# Patient Record
Sex: FEMALE | Race: BLACK OR AFRICAN AMERICAN | NOT HISPANIC OR LATINO | Employment: FULL TIME | URBAN - METROPOLITAN AREA
[De-identification: names, ages, dates, MRNs, and addresses within clinical notes are randomized per-mention and may not be internally consistent; named-entity substitution may affect disease eponyms.]

---
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
## 2021-03-10 ENCOUNTER — HISTORICAL (OUTPATIENT)
Dept: ADMINISTRATIVE | Facility: HOSPITAL | Age: 21
End: 2021-03-10

## 2021-03-10 LAB
CHOLEST SERPL-MCNC: 184 MG/DL
CHOLEST/HDLC SERPL: 6 {RATIO} (ref 0–5)
EST. AVERAGE GLUCOSE BLD GHB EST-MCNC: 116.9 MG/DL
HAV IGM SERPL QL IA: NONREACTIVE
HBA1C MFR BLD: 5.7 %
HBV CORE IGM SERPL QL IA: NONREACTIVE
HBV SURFACE AG SERPL QL IA: NONREACTIVE
HCV AB SERPL QL IA: NONREACTIVE
HDLC SERPL-MCNC: 32 MG/DL (ref 35–60)
HIV 1+2 AB+HIV1 P24 AG SERPL QL IA: NONREACTIVE
LDLC SERPL CALC-MCNC: 130 MG/DL (ref 50–140)
POC BETA-HCG (QUAL): NEGATIVE
PROLACTIN LEVEL (OHS): 10.59 NG/ML (ref 5.18–26.53)
T PALLIDUM AB SER QL: NONREACTIVE
TRIGL SERPL-MCNC: 109 MG/DL (ref 37–140)
TSH SERPL-ACNC: 0.02 UIU/ML (ref 0.35–4.94)
VLDLC SERPL CALC-MCNC: 22 MG/DL

## 2022-04-10 ENCOUNTER — HISTORICAL (OUTPATIENT)
Dept: ADMINISTRATIVE | Facility: HOSPITAL | Age: 22
End: 2022-04-10
Payer: MEDICAID

## 2022-04-25 VITALS
SYSTOLIC BLOOD PRESSURE: 117 MMHG | HEIGHT: 62 IN | WEIGHT: 251.75 LBS | OXYGEN SATURATION: 100 % | BODY MASS INDEX: 46.33 KG/M2 | DIASTOLIC BLOOD PRESSURE: 79 MMHG

## 2022-05-03 NOTE — HISTORICAL [facility] CERNER
This is a historical note converted from [employer]. Formatting and pictures may have been removed.  Please reference [employer] for original formatting and attached multimedia. Chief Complaint  Annual  History of Present Illness  Patient has PMH obesity. Follows with outside PCP.  ?   Requesting STD testing; Does not want to do breast or pelvic exam.  Reports cycles are irregular.  Previously on OCPs but stopped because she was?forgetting to take.  Sexually active, occasionally uses protection.  Denied breast complaints.  Denied abnormal vaginal? pain, discharge.?  Denied family history of breast, ovarian, uterine, colon, cancer?  Review of Systems  ?  Denied dyspareunia, Denied dysmenorrhea  Denied breast changes?  Denied changes in stool  Denied constipation  Denied insomnia  Denied depression, Denied anxiety  Denied skin changes  Denied weight changes  Denied chest pain,Denied SOB  Physical Exam  Vitals & Measurements  T:?36.9? ?C (Oral)? HR:?91(Peripheral)? RR:?18? BP:?117/79? SpO2:?100%?  HT:?158.00?cm? WT:?114.200?kg? BMI:?45.75?  General: Alert, well appearing, in no acute distress, pleasant  Eye: EOMI, clear conjunctiva, No pallor,?  HENT: wearing mask  Neck: full range of motion, no thyromegaly or lymphadenopathy appreciated  Breast: Declined  Respiratory: breathing unlabored, no audible wheezing  Cardiovascular: regular rate and rhythm?  Gastrointestinal: soft, non-tender, non-distended?  Genitourinary: Declined  Musculoskeletal: Able to ambulate to exam table without difficulty  No open wounds, no ulcerations, sensations grossly intact  Integumentary: no rashes or skin lesions visible  Neurologic: no signs of peripheral neurological deficit, motor/sensory function intact ?  Assessment/Plan  Concern about STD in female without diagnosis?Z71.1  Ordered:  Chlam trachom & N gonorrhoeae by [facility] [id number], Routine collect, Cervical, Order for future visit, 03/10/21 14:09:00 CST, Stop date 03/10/21 14:09:00 CST,  Nurse collect, Obesity  Contraception management  Concern about STD in female without diagnosis, 03/10/21 14:09:00 CST  Hepatitis Panel-, Routine collect, 03/10/21 14:10:00 CST, Blood, Order for future visit, Stop date 03/10/21 14:10:00 CST, Lab Collect, Obesity  Contraception management  Concern about STD in female without diagnosis, 03/10/21 14:10:00 CST  HIV 1 and 2, Now collect, 03/10/21 14:09:00 CST, Blood, Order for future visit, Stop date 03/10/21 14:09:00 CST, Lab Collect, Obesity  Contraception management  Concern about STD in female without diagnosis, 03/10/21 14:09:00 CST  Office/Outpatient Visit Level 4 New 10050 PC, Obesity  Contraception management  Concern about STD in female without diagnosis, 03/10/21 14:13:00 CST  Syphilis Antibody (with Reflex RPR), Now collect, 03/10/21 14:10:00 CST, Blood, Order for future visit, Stop date 03/10/21 14:10:00 CST, Lab Collect, Obesity  Contraception management  Concern about STD in female without diagnosis, 03/10/21 14:10:00 CST  Wet Prep Smear, Stat collect, Vaginal, Order for future visit, 03/10/21 14:09:00 CST, Stop date 03/10/21 14:09:00 CST, Nurse collect, Obesity  Contraception management  Concern about STD in female without diagnosis, 03/10/21 14:09:00 CST  ?  Contraception management?Z30.9  Ordered:  Chlam trachom & N gonorrhoeae by [facility] [id number], Routine collect, Cervical, Order for future visit, 03/10/21 14:09:00 CST, Stop date 03/10/21 14:09:00 CST, Nurse collect, Obesity  Contraception management  Concern about STD in female without diagnosis, 03/10/21 14:09:00 CST  Hepatitis Panel-, Routine collect, 03/10/21 14:10:00 CST, Blood, Order for future visit, Stop date 03/10/21 14:10:00 CST, Lab Collect, Obesity  Contraception management  Concern about STD in female without diagnosis, 03/10/21 14:10:00 CST  HIV 1 and 2, Now collect, 03/10/21 14:09:00 CST, Blood, Order for future visit, Stop date 03/10/21 14:09:00 CST, Lab Collect,  Obesity  Contraception management  Concern about STD in female without diagnosis, 03/10/21 14:09:00 CST  Office/Outpatient Visit Level 4 New [id number] PC, Obesity  Contraception management  Concern about STD in female without diagnosis, 03/10/21 14:13:00 CST  Syphilis Antibody (with Reflex RPR), Now collect, 03/10/21 14:10:00 CST, Blood, Order for future visit, Stop date 03/10/21 14:10:00 CST, Lab Collect, Obesity  Contraception management  Concern about STD in female without diagnosis, 03/10/21 14:10:00 CST  Urine Pregnancy POC, 03/10/21 14:09:00 CST  Wet Prep Smear, Stat collect, Vaginal, Order for future visit, 03/10/21 14:09:00 CST, Stop date 03/10/21 14:09:00 CST, Nurse collect, Obesity  Contraception management  Concern about STD in female without diagnosis, 03/10/21 14:09:00 CST  ?  Obesity?E66.9  ?1. Will order GC/CT, wet prep, HIV, Hep, Syphilis  ?2.? UTP negative; sprintec sent to pharmacy  ?3. TSH, lipid panel, prolactin ordered as well. Labs from most recent ED visits reviewed  ?4. Discussed safe sex practices and compliance with OCPS  ?5. Will address lifestyle modifications at next visit  Ordered:  Chlam trachom & N gonorrhoeae by [facility] [id number], Routine collect, Cervical, Order for future visit, 03/10/21 14:09:00 CST, Stop date 03/10/21 14:09:00 CST, Nurse collect, Obesity  Contraception management  Concern about STD in female without diagnosis, 03/10/21 14:09:00 CST  Hemoglobin A1C UHC, Routine collect, 03/10/21 14:08:00 CST, Blood, Order for future visit, Stop date 03/10/21 14:08:00 CST, Lab Collect, Obesity, 03/10/21 14:08:00 CST  Hepatitis Panel-, Routine collect, 03/10/21 14:10:00 CST, Blood, Order for future visit, Stop date 03/10/21 14:10:00 CST, Lab Collect, Obesity  Contraception management  Concern about STD in female without diagnosis, 03/10/21 14:10:00 CST  HIV 1 and 2, Now collect, 03/10/21 14:09:00 CST, Blood, Order for future visit, Stop date 03/10/21 14:09:00 CST, Lab  Collect, Obesity  Contraception management  Concern about STD in female without diagnosis, 03/10/21 14:09:00 CST  Lipid Panel, Routine collect, 03/10/21 14:09:00 CST, Blood, Order for future visit, Stop date 03/10/21 14:09:00 CST, Lab Collect, Obesity, 03/10/21 14:09:00 CST  Office/Outpatient Visit Level 4 New [phone] PC, Obesity  Contraception management  Concern about STD in female without diagnosis, 03/10/21 14:13:00 CST  Syphilis Antibody (with Reflex RPR), Now collect, 03/10/21 14:10:00 CST, Blood, Order for future visit, Stop date 03/10/21 14:10:00 CST, Lab Collect, Obesity  Contraception management  Concern about STD in female without diagnosis, 03/10/21 14:10:00 CST  Thyroid Stimulating Hormone, Routine collect, 03/10/21 14:08:00 CST, Blood, Order for future visit, Stop date 03/10/21 14:08:00 CST, Lab Collect, Obesity, 03/10/21 14:08:00 CST  Wet Prep Smear, Stat collect, Vaginal, Order for future visit, 03/10/21 14:09:00 CST, Stop date 03/10/21 14:09:00 CST, Nurse collect, Obesity  Contraception management  Concern about STD in female without diagnosis, 03/10/21 14:09:00 CST  ?  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  Obesity  Historical  No qualifying data  Procedure/Surgical History  Removal of implant; deep (eg, buried wire, pin, screw, metal band, nail, marguerite or plate) (04/13/2017)  Removal of Other Device from Right Lower Extremity, Open Approach (04/13/2017)  Removal Plate, Screws, Pins Lower Extremity (Right) (04/13/2017)  ORIF Femur (Right) (06/30/2016)  Reposition Right Lower Femur with Internal Fixation Device, Open Approach (06/30/2016)  tonsillectomy (01/01/2004)   Medications  No active medications  Allergies  No Known Allergies  Social  History  Abuse/Neglect  No, 02/03/2021  Alcohol  Never, 07/13/2016  Employment/School  Employed, 03/10/2021  Exercise  Home/Environment  Lives with Alone., 03/10/2021  Nutrition/Health  Regular, 03/10/2021  Sexual  Sexually active: Yes., 03/10/2021  Substance Use  Never, 07/13/2016  Tobacco  Never (less than 100 in lifetime), No, 02/03/2021  Family History  ASTHMA: Sister and Brother.  Hypertension.: Mother.  Primary malignant neoplasm of breast: Paternal Grandmother.  Immunizations  Vaccine Date Status   meningococcal group B vaccine 08/13/2019 Recorded   hepatitis A pediatric vaccine 08/13/2019 Recorded   meningococcal group B vaccine 10/11/2017 Recorded   meningococcal conjugate vaccine 10/11/2017 Recorded   influenza virus vaccine, inactivated 10/11/2017 Recorded   hepatitis A pediatric vaccine 10/11/2017 Recorded   influenza virus vaccine, live, trivalent 11/05/2015 Recorded   human papillomavirus vaccine 03/09/2015 Recorded   human papillomavirus vaccine 09/16/2014 Recorded   human papillomavirus vaccine 08/04/2014 Recorded   influenza virus vaccine, inactivated 02/17/2014 Recorded   human papillomavirus vaccine 02/17/2014 Recorded   tetanus/diphtheria/pertussis, acel(Tdap) 01/10/2013 Recorded   meningococcal conjugate vaccine 01/10/2013 Recorded   influenza virus vaccine, inactivated 01/10/2013 Recorded   influenza virus vaccine, inactivated 11/17/2011 Recorded   varicella virus vaccine 05/14/2009 Recorded   influenza virus vaccine, inactivated 11/14/2006 Recorded   poliovirus vaccine, inactivated 03/14/2006 Recorded   measles/mumps/rubella virus vaccine 03/14/2006 Recorded   haemophilus b conjugate (HbOC) vaccine 03/14/2006 Recorded   diphtheria/pertussis, acel/tetanus ped 03/14/2006 Recorded   varicella virus vaccine 01/16/2003 Recorded   poliovirus vaccine, inactivated 01/16/2003 Recorded   measles/mumps/rubella virus vaccine 01/16/2003 Recorded   hepatitis B pediatric vaccine 01/16/2003 Recorded    poliovirus vaccine, live, trivalent 02/21/2002 Recorded   poliovirus vaccine, live, trivalent 2001 Recorded   hepatitis B pediatric vaccine 2001 Recorded   hepatitis B pediatric vaccine 2001 Recorded   Health Maintenance  Health Maintenance  ???Pending?(in the next year)  ??? ??Due?  ??? ? ? ?ADL Screening due??03/10/21??and every 1??year(s)  ??? ??Due In Future?  ??? ? ? ?Obesity Screening not due until??01/01/22??and every 1??year(s)  ??? ? ? ?Alcohol Misuse Screening not due until??01/02/22??and every 1??year(s)  ???Satisfied?(in the past 1 year)  ??? ??Satisfied?  ??? ? ? ?Alcohol Misuse Screening on??03/10/21.??Satisfied by Gilda Manzo  ??? ? ? ?Blood Pressure Screening on??03/10/21.??Satisfied by Gilda Manzo  ??? ? ? ?Body Mass Index Check on??03/10/21.??Satisfied by Gilda Manzo  ??? ? ? ?Influenza Vaccine on??03/10/21.??Satisfied by Gilda Manzo  ??? ? ? ?Obesity Screening on??03/10/21.??Satisfied by Gilda Manzo  ?

## 2022-05-09 VITALS
OXYGEN SATURATION: 100 % | RESPIRATION RATE: 18 BRPM | SYSTOLIC BLOOD PRESSURE: 140 MMHG | HEART RATE: 101 BPM | HEIGHT: 64 IN | WEIGHT: 251.44 LBS | DIASTOLIC BLOOD PRESSURE: 86 MMHG | BODY MASS INDEX: 42.93 KG/M2 | TEMPERATURE: 99 F

## 2022-05-09 PROCEDURE — 85025 COMPLETE CBC W/AUTO DIFF WBC: CPT | Performed by: STUDENT IN AN ORGANIZED HEALTH CARE EDUCATION/TRAINING PROGRAM

## 2022-05-09 PROCEDURE — 80048 BASIC METABOLIC PNL TOTAL CA: CPT | Performed by: STUDENT IN AN ORGANIZED HEALTH CARE EDUCATION/TRAINING PROGRAM

## 2022-05-09 PROCEDURE — 99283 EMERGENCY DEPT VISIT LOW MDM: CPT

## 2022-05-09 PROCEDURE — 36415 COLL VENOUS BLD VENIPUNCTURE: CPT | Performed by: STUDENT IN AN ORGANIZED HEALTH CARE EDUCATION/TRAINING PROGRAM

## 2022-05-10 ENCOUNTER — HOSPITAL ENCOUNTER (EMERGENCY)
Facility: HOSPITAL | Age: 22
Discharge: LEFT BEFORE DISCHARGE | End: 2022-05-10
Attending: STUDENT IN AN ORGANIZED HEALTH CARE EDUCATION/TRAINING PROGRAM
Payer: MEDICAID

## 2022-05-10 DIAGNOSIS — N93.9 VAGINAL BLEEDING: Primary | ICD-10-CM

## 2022-05-10 LAB
ANION GAP SERPL CALC-SCNC: 9 MEQ/L
BASOPHILS # BLD AUTO: 0.03 X10(3)/MCL (ref 0–0.2)
BASOPHILS NFR BLD AUTO: 0.3 %
BUN SERPL-MCNC: 11 MG/DL (ref 7–18.7)
CALCIUM SERPL-MCNC: 9.5 MG/DL (ref 8.4–10.2)
CHLORIDE SERPL-SCNC: 104 MMOL/L (ref 98–107)
CO2 SERPL-SCNC: 26 MMOL/L (ref 22–29)
CREAT SERPL-MCNC: 0.62 MG/DL (ref 0.55–1.02)
CREAT/UREA NIT SERPL: 18
EOSINOPHIL # BLD AUTO: 0.36 X10(3)/MCL (ref 0–0.9)
EOSINOPHIL NFR BLD AUTO: 3 %
ERYTHROCYTE [DISTWIDTH] IN BLOOD BY AUTOMATED COUNT: 15.9 % (ref 11.5–17)
GLUCOSE SERPL-MCNC: 87 MG/DL (ref 74–100)
HCT VFR BLD AUTO: 33.3 % (ref 37–47)
HGB BLD-MCNC: 9.6 GM/DL (ref 12–16)
IMM GRANULOCYTES # BLD AUTO: 0.04 X10(3)/MCL (ref 0–0.02)
IMM GRANULOCYTES NFR BLD AUTO: 0.3 % (ref 0–0.43)
LYMPHOCYTES # BLD AUTO: 3.07 X10(3)/MCL (ref 0.6–4.6)
LYMPHOCYTES NFR BLD AUTO: 25.9 %
MCH RBC QN AUTO: 19 PG (ref 27–31)
MCHC RBC AUTO-ENTMCNC: 28.8 MG/DL (ref 33–36)
MCV RBC AUTO: 66.1 FL (ref 80–94)
MONOCYTES # BLD AUTO: 0.87 X10(3)/MCL (ref 0.1–1.3)
MONOCYTES NFR BLD AUTO: 7.3 %
NEUTROPHILS # BLD AUTO: 7.5 X10(3)/MCL (ref 2.1–9.2)
NEUTROPHILS NFR BLD AUTO: 63.2 %
NRBC BLD AUTO-RTO: 0 %
PLATELET # BLD AUTO: 420 X10(3)/MCL (ref 130–400)
PMV BLD AUTO: 11.1 FL (ref 9.4–12.4)
POTASSIUM SERPL-SCNC: 4 MMOL/L (ref 3.5–5.1)
RBC # BLD AUTO: 5.04 X10(6)/MCL (ref 4.2–5.4)
SODIUM SERPL-SCNC: 139 MMOL/L (ref 136–145)
WBC # SPEC AUTO: 11.9 X10(3)/MCL (ref 4.5–11.5)

## 2023-03-30 NOTE — ED PROVIDER NOTES
Encounter Date: 5/9/2022       History     Chief Complaint   Patient presents with    Vaginal Bleeding     Pt reports vaginal bleeding x 6 months. Hx of irregular periods but has never lasted this long. Pt reports increases amount and size of blood clots for the past 2 days. Pt also c/o abdominal pain and weakness.     Abdominal Pain     21-year-old female with past medical history of abnormal uterine bleeding presenting today with vaginal bleeding.  She states that for the past 6 months she has had the continued vaginal bleeding.  She has not yet followed up with her OBGYN about this bleeding.  Past couple days the bleeding has gotten worse and she has had worsening clots so she came to the emergency department for evaluation.    The history is provided by the patient.   Vaginal Bleeding  This is a chronic problem. The current episode started more than 1 week ago. The problem occurs daily. The problem has not changed since onset.Pertinent negatives include no chest pain, no abdominal pain, no headaches and no shortness of breath. Nothing aggravates the symptoms. Nothing relieves the symptoms. She has tried nothing for the symptoms. The treatment provided no relief.     Review of patient's allergies indicates:  No Known Allergies  History reviewed. No pertinent past medical history.  Past Surgical History:   Procedure Laterality Date    TONSILLECTOMY       Family History   Problem Relation Age of Onset    Heart failure Maternal Grandmother     Breast cancer Paternal Grandmother      Social History     Tobacco Use    Smoking status: Never Smoker    Smokeless tobacco: Never Used   Substance Use Topics    Alcohol use: Never    Drug use: Never     Review of Systems   Constitutional: Negative for activity change, chills and fever.   HENT: Negative for congestion and facial swelling.    Eyes: Negative for pain, discharge and redness.   Respiratory: Negative for cough, shortness of breath and wheezing.     Cardiovascular: Negative for chest pain and leg swelling.   Gastrointestinal: Negative for abdominal pain, diarrhea, nausea and vomiting.   Genitourinary: Positive for vaginal bleeding. Negative for difficulty urinating and vaginal discharge.   Musculoskeletal: Negative for gait problem and neck stiffness.   Skin: Negative for color change and pallor.   Neurological: Negative for dizziness and headaches.       Physical Exam     Initial Vitals [05/09/22 2208]   BP Pulse Resp Temp SpO2   (!) 140/86 101 18 98.6 °F (37 °C) 100 %      MAP       --         Physical Exam    Nursing note and vitals reviewed.  Constitutional: She appears well-developed. She is not diaphoretic. No distress.   HENT:   Head: Normocephalic and atraumatic.   Eyes: Conjunctivae and EOM are normal. Right eye exhibits no discharge. Left eye exhibits no discharge.   Neck: Neck supple. No tracheal deviation present.   Normal range of motion.  Cardiovascular: Normal rate and regular rhythm.   Pulmonary/Chest: No respiratory distress.   Abdominal: Abdomen is soft. She exhibits no distension. There is no abdominal tenderness.   Musculoskeletal:         General: Normal range of motion.      Cervical back: Normal range of motion and neck supple.     Neurological: She is alert and oriented to person, place, and time. She has normal strength.   Skin: Skin is warm and dry.         ED Course   Procedures  Labs Reviewed   CBC WITH DIFFERENTIAL - Abnormal; Notable for the following components:       Result Value    WBC 11.9 (*)     Hgb 9.6 (*)     Hct 33.3 (*)     MCV 66.1 (*)     MCH 19.0 (*)     MCHC 28.8 (*)     Platelet 420 (*)     IG# 0.04 (*)     All other components within normal limits   BASIC METABOLIC PANEL   CBC W/ AUTO DIFFERENTIAL    Narrative:     The following orders were created for panel order CBC auto differential.  Procedure                               Abnormality         Status                     ---------                                done -----------         ------                     CBC with Differential[[id number]]        Abnormal            Final result                 Please view results for these tests on the individual orders.   POCT URINE PREGNANCY          Imaging Results    None          Medications - No data to display  Medical Decision Making:   ED Management:  Pt presents to the ED with complaints of vaginal bleeding     Differential considerations include: Ectopic pregnancy, Spontaneous , Polyps, Fibroids, Adenomyosis, Ovulatory dysfunction   Evaluated the patient in the emergency department.  She was stable and well appearing.  This has been going on for 6 months and is unlikely there is any acute intervention at when needed.  Blood work was unremarkable.  Prior to her giving us a urine sample for a UPT she eloped.  She was stable at time of my evaluation is done which is stable when she left.  I instructed her to follow-up with her OBGYN before she eloped.                      Clinical Impression:   Final diagnoses:  [N93.9] Vaginal bleeding (Primary)          ED Disposition Condition    Eloped               Maxwell Blancas MD  05/10/22 0353